# Patient Record
Sex: MALE | Race: WHITE | Employment: FULL TIME | URBAN - METROPOLITAN AREA
[De-identification: names, ages, dates, MRNs, and addresses within clinical notes are randomized per-mention and may not be internally consistent; named-entity substitution may affect disease eponyms.]

---

## 2024-08-26 ENCOUNTER — OFFICE VISIT (OUTPATIENT)
Age: 44
End: 2024-08-26

## 2024-08-26 VITALS
OXYGEN SATURATION: 94 % | HEART RATE: 83 BPM | WEIGHT: 264 LBS | TEMPERATURE: 98.7 F | HEIGHT: 75 IN | RESPIRATION RATE: 16 BRPM | DIASTOLIC BLOOD PRESSURE: 95 MMHG | BODY MASS INDEX: 32.83 KG/M2 | SYSTOLIC BLOOD PRESSURE: 141 MMHG

## 2024-08-26 DIAGNOSIS — H60.92 OTITIS EXTERNA OF LEFT EAR, UNSPECIFIED CHRONICITY, UNSPECIFIED TYPE: ICD-10-CM

## 2024-08-26 DIAGNOSIS — R03.0 ELEVATED BLOOD PRESSURE READING: ICD-10-CM

## 2024-08-26 DIAGNOSIS — H66.90 ACUTE OTITIS MEDIA, UNSPECIFIED OTITIS MEDIA TYPE: Primary | ICD-10-CM

## 2024-08-26 DIAGNOSIS — H61.21 IMPACTED CERUMEN OF RIGHT EAR: ICD-10-CM

## 2024-08-26 RX ORDER — CIPROFLOXACIN AND DEXAMETHASONE 3; 1 MG/ML; MG/ML
4 SUSPENSION/ DROPS AURICULAR (OTIC) 2 TIMES DAILY
Qty: 7.5 ML | Refills: 0 | Status: SHIPPED | OUTPATIENT
Start: 2024-08-26 | End: 2024-09-02

## 2024-08-26 NOTE — PROGRESS NOTES
Tyler Pan (:  1980) is a 44 y.o. male,New patient, here for evaluation of the following chief complaint(s):  Otalgia (Pt c/o cold symptoms, clogged ears for 3 days. At home COVID test yesterday was negative. Pt states no drainage. )       ASSESSMENT/PLAN:  1. Acute otitis media, unspecified otitis media type  -     amoxicillin-clavulanate (AUGMENTIN) 875-125 MG per tablet; Take 1 tablet by mouth 2 times daily for 10 days, Disp-20 tablet, R-0Normal  2. Elevated blood pressure reading  -     Ambulatory referral to Internal Medicine  3. Impacted cerumen of right ear  -     VT CALORIC VESTIBULAR TEST EACH IRRIGATION  -     carbamide peroxide (DEBROX) 6.5 % otic solution; Place 5 drops in ear(s) 2 times daily, Disp-15 mL, R-0Normal  4. Otitis externa of left ear, unspecified chronicity, unspecified type  -     ciprofloxacin-dexAMETHasone (CIPRODEX) 0.3-0.1 % otic suspension; Place 4 drops into the left ear 2 times daily for 7 days, Disp-7.5 mL, R-0Normal      Use ear drops in left ear for otitis externa.  Take oral antibiotic for otitis media.  Please follow up with your PCP or Internal Medicine for elevated blood pressure today in clinic. Referral to IM given.  Call or return to clinic if no improvement or any worsening  Patient comfortable with plan         SUBJECTIVE/OBJECTIVE:    History provided by:  Patient   used: No    Otalgia           44 y.o. male presents with symptoms of Ear Pain, left otitis externa, and left otitis media  Patient complains of left ear pain.  Onset of symptoms was 3 days ago, gradually worsening since that time. He also notes ringing both sides.  He does not have a history of ear infections as an adult.    Ear Pain  Patient complains of ear pain and possible ear infection. Symptoms include bilateral ear pain and plugged sensation in both ears. Onset of symptoms was 3 days ago, unchanged since that time. He also c/o 3 days bilateral ear pressure/pain.  He

## 2024-08-26 NOTE — PATIENT INSTRUCTIONS
Use ear drops in left ear for otitis externa.  Take oral antibiotic for otitis media.  Please follow up with your PCP or Internal Medicine for elevated blood pressure today in clinic. Referral to IM given.  Call or return to clinic if no improvement or any worsening